# Patient Record
Sex: MALE | Race: WHITE | NOT HISPANIC OR LATINO | URBAN - METROPOLITAN AREA
[De-identification: names, ages, dates, MRNs, and addresses within clinical notes are randomized per-mention and may not be internally consistent; named-entity substitution may affect disease eponyms.]

---

## 2024-10-26 ENCOUNTER — EMERGENCY (EMERGENCY)
Facility: HOSPITAL | Age: 79
LOS: 1 days | Discharge: HOME | End: 2024-10-26
Attending: EMERGENCY MEDICINE | Admitting: EMERGENCY MEDICINE
Payer: MEDICARE

## 2024-10-26 VITALS
HEIGHT: 67 IN | SYSTOLIC BLOOD PRESSURE: 154 MMHG | DIASTOLIC BLOOD PRESSURE: 67 MMHG | TEMPERATURE: 98 F | OXYGEN SATURATION: 97 % | HEART RATE: 79 BPM | WEIGHT: 197.09 LBS | RESPIRATION RATE: 16 BRPM

## 2024-10-26 VITALS
RESPIRATION RATE: 16 BRPM | DIASTOLIC BLOOD PRESSURE: 72 MMHG | HEART RATE: 85 BPM | OXYGEN SATURATION: 100 % | SYSTOLIC BLOOD PRESSURE: 145 MMHG | TEMPERATURE: 98 F

## 2024-10-26 PROCEDURE — 99284 EMERGENCY DEPT VISIT MOD MDM: CPT | Mod: 25

## 2024-10-26 PROCEDURE — 70450 CT HEAD/BRAIN W/O DYE: CPT | Mod: 26,MC

## 2024-10-26 PROCEDURE — 12011 RPR F/E/E/N/L/M 2.5 CM/<: CPT | Mod: GC

## 2024-10-26 PROCEDURE — 72192 CT PELVIS W/O DYE: CPT | Mod: 26,MC

## 2024-10-26 RX ORDER — LIDOCAINE HCL 20 MG/ML
10 AMPUL (ML) INJECTION ONCE
Refills: 0 | Status: COMPLETED | OUTPATIENT
Start: 2024-10-26 | End: 2024-10-26

## 2024-10-26 RX ORDER — DOXYCYCLINE HYCLATE 100 MG
100 CAPSULE ORAL ONCE
Refills: 0 | Status: COMPLETED | OUTPATIENT
Start: 2024-10-26 | End: 2024-10-26

## 2024-10-26 RX ORDER — DOXYCYCLINE HYCLATE 100 MG
1 CAPSULE ORAL
Qty: 14 | Refills: 0
Start: 2024-10-26 | End: 2024-11-01

## 2024-10-26 RX ADMIN — Medication 10 MILLILITER(S): at 19:53

## 2024-10-26 RX ADMIN — Medication 100 MILLIGRAM(S): at 20:47

## 2024-10-26 NOTE — ED ADULT TRIAGE NOTE - CHIEF COMPLAINT QUOTE
Pt walked in c/o trip and fall today. Pt reports hitting his head. + lac to R ear. Denies loc or ac use. +R hip pain. Tdap utd. Pt was sent in from

## 2024-10-26 NOTE — ED PROVIDER NOTE - OBJECTIVE STATEMENT
78 yo M PMH balance issues, prostate  PCN allergy  sent from  for further evaluation after a fall  Patient reports that his walker caught on something and he fell  He injured his right ear which has a laceration to the top of the pinna as well as his right hip  No loc, neck pain, cp, back pain, abd pain, other ext injury or pain

## 2024-10-26 NOTE — ED PROVIDER NOTE - CLINICAL SUMMARY MEDICAL DECISION MAKING FREE TEXT BOX
78 yo M s/p mechanical fall with injuries to the right ear and right hip.  Will send for CT to assess for ICH/fx and will repair wound. Angel. amy. 78 yo M s/p mechanical fall with injuries to the right ear and right hip.  Will send for CT to assess for ICH/fx and will repair wound. Tet. utd.    Patient remained well throughout visit.  Ambulating well with his rollator.  Wound repaired per procedure note.  Discussed results, dx, treatment, follow up plan as well as strict return precautions in great detail with the patient and/or family member(s).  Questions answered and patient and or family member(s) verbalized understanding of the discussion and plan

## 2024-10-26 NOTE — ED ADULT NURSE NOTE - NSFALLUNIVINTERV_ED_ALL_ED
Bed/Stretcher in lowest position, wheels locked, appropriate side rails in place/Call bell, personal items and telephone in reach/Instruct patient to call for assistance before getting out of bed/chair/stretcher/Non-slip footwear applied when patient is off stretcher/[place] to call system/Physically safe environment - no spills, clutter or unnecessary equipment/Purposeful proactive rounding/Room/bathroom lighting operational, light cord in reach

## 2024-10-26 NOTE — ED ADULT NURSE NOTE - NS ED NURSE LEVEL OF CONSCIOUSNESS AFFECT
Anesthesia Evaluation     no history of anesthetic complications:  NPO Solid Status: > 8 hours  NPO Liquid Status: > 8 hours           Airway   Mallampati: II  TM distance: >3 FB  Neck ROM: limited  Possible difficult intubation  Dental    (+) lower dentures and upper dentures    Pulmonary    (+) COPD, decreased breath sounds,     ROS comment: On nasal canula O2 in hospital  Cardiovascular   Exercise tolerance: poor (<4 METS)    ECG reviewed  PT is on anticoagulation therapy  Patient on routine beta blocker and Beta blocker given within 24 hours of surgery  Rhythm: irregular  Rate: normal    (+) hypertension well controlled, dysrhythmias Atrial Flutter, hyperlipidemia,   (-) angina, murmur    ROS comment: Atrial flutter  ST &  T wave abnormality, consider inferior ischemia  ST &  T wave abnormality, consider anterolateral ischemia  Prolonged QT  Abnormal ECG  When compared with ECG of 08-MAR-2018 05:55,  Non-specific change in ST segment in Inferior leads  ST more depressed in Lateral leads  Inverted T waves have replaced nonspecific T wave abnormality in Inferior  leads  Inverted T waves have replaced nonspecific T wave abnormality in Lateral  leads  Confirmed by AKRAM    · Left ventricular systolic function is normal. Estimated EF = 55%.  · Left ventricular diastolic dysfunction (grade I) consistent with impaired relaxation.  · Mild mitral valve regurgitation is present  · Mild tricuspid valve regurgitation is present.       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       Calm

## 2024-10-26 NOTE — ED PROVIDER NOTE - NSFOLLOWUPINSTRUCTIONS_ED_ALL_ED_FT
YOUR CAT SCANS DID NOT SHOW ANY BROKEN BONES OR BLEEDING IN THE BRAIN    HOWEVER, CAT SCANS DO NOT SHOW IF A PERSON HAS A CONCUSSION.  A CONCUSSION IS A GROUP OF SYMPTOMS THAT CAN DEVELOPED AFTER A PERSON HAS A HEAD INJURY.  THESE SYMPTOMS INCLUDE: HEADACHE, NAUSEA, DIZZINESS, A FOGGY FEELING.  THEY CAN LAST ANYWHERE FROM A DAY TO A WEEK OR LONGER.      IT IS IMPORTANT TO HAVE A FOLLOW UP VISIT WITH YOUR DOCTOR THIS WEEK TO CHECK ON YOUR SYMPTOMS.  IF YOU DO DEVELOP A CONCUSSION, THEY WILL NEED TO MONITOR YOU FOR IMPROVEMENT AND TO DETERMINE IF YOU NEED TO SEE A NEUROLOGIST FOR SPECIALTY CARE.     FOR NOW, REST AS NEEDED AND KEEP WELL-HYDRATED.  TAKE TYLENOL FOR PAIN.  YOU DO NOT NEED TO STAY IN BED.  YOU CAN DO YOUR NORMAL ACTIVITIES.  BE MINDFUL OF THINGS THAT MAKE YOU HAVE ANY OF THE ABOVE SYMPTOMS.  MANY PEOPLE FIND THAT USING THEIR PHONE OR WATCHING TV CAN TRIGGER SYMPTOMS.  IF THIS HAPPENS, DO THESE ACTIVITIES FOR SHORTER PERIODS OF TIME.     BLEEDING IN THE BRAIN CAN SOMETIMES SHOW UP AT A LATER TIME, EVEN A MONTH OR LONGER AFTER THE ORIGINAL INJURY.  IT IS UNUSUAL, BUT IMPORTANT TO KNOW ABOUT. IF YOU NOTICE THAT YOU ARE HAVING HEADACHES THAT ARE NEW OR UNUSUAL FOR YOU OR THAT YOU ARE NAUSEAS, OFF-BALANCE, HAVE BLURRED VISION, TROUBLE WITH YOUR MEMORY OR ANY OTHER NEW OR CONCERNING SYMPTOMS, YOU NEED TO BE SEEN IN THE EMERGENCY ROOM TO HAVE ANOTHER CAT SCAN.     RETURN TO THE EMERGENCY ROOM IMMEDIATELY FOR:  VOMITING  SEVERE HEADACHE  VOMITING  NUMBNESS OR WEAKNESS TO THE FACE, ARMS OR LEGS  TROUBLE WALKING, TALKING OR SEEING  ANY NEW, WORSENING OR CONCERNING SYMPTOMS.         FOR THE CUT:    KEEP DRY AS MUCH AS POSSIBLE  DO NOT USE BANDAIDS TO COVER AS THEY CAN PULL THE STITCHES OUT  YOU WILL BE ON ANTIBIOTICS TO HELP PREVENT INFECTION  THE STITCHES CAN BE REMOVED IN ONE WEEK.  YOU CAN RETURN HERE OR GO TO URGENT CARE OR YOUR DOCTOR FOR THIS    RETURN TO THE EMERGENCY ROOM IF:  THE WOUND IS BLEEDING  THE STITCHES FALL OUT OR THE WOUND OPENS  THE EAR/WOUND GETS RED OR SWOLLEN  PUS IS DRAINING FROM THE WOUND  YOU HAVE A FEVER  ANY NEW, WORSENING OR CONCERNING SYMPTOMS

## 2024-10-26 NOTE — ED PROVIDER NOTE - PATIENT PORTAL LINK FT
You can access the FollowMyHealth Patient Portal offered by [facility] by registering at the following website: [email or web address]. By joining [facility]’s FollowMyHealth portal, you will also be able to view your health information using other applications (apps) compatible with our system.

## 2024-10-26 NOTE — ED PROVIDER NOTE - PHYSICAL EXAMINATION
General:  Well appearing, no distress  HEENT:  No conjunctival injection, neck supple, no congestion, no scalp hematoma, no cervical spine tenderness.  There is a stellate 1.5 cm laceration to the superior aspect of the right pinna  Chest:  Non-tender, no crepitance  Lungs:  Clear to auscultation bilaterally   Heart:  s1s2 normal, no murmur  Abdomen:  soft, non-tender, non-distended  :  Deferred  Rectal:  Deferred  Extremities: No edema, normal perfusion, no joint swelling or tenderness  Neuro:  Alert, conversant, motor/sensory grossly intact
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
## 2024-10-29 DIAGNOSIS — S79.911A UNSPECIFIED INJURY OF RIGHT HIP, INITIAL ENCOUNTER: ICD-10-CM

## 2024-10-29 DIAGNOSIS — S09.90XA UNSPECIFIED INJURY OF HEAD, INITIAL ENCOUNTER: ICD-10-CM

## 2024-10-29 DIAGNOSIS — Z88.0 ALLERGY STATUS TO PENICILLIN: ICD-10-CM

## 2024-11-02 ENCOUNTER — EMERGENCY (EMERGENCY)
Facility: HOSPITAL | Age: 79
LOS: 1 days | Discharge: HOME | End: 2024-11-02
Admitting: EMERGENCY MEDICINE
Payer: MEDICARE

## 2024-11-02 VITALS
HEIGHT: 67 IN | SYSTOLIC BLOOD PRESSURE: 123 MMHG | OXYGEN SATURATION: 99 % | DIASTOLIC BLOOD PRESSURE: 73 MMHG | HEART RATE: 77 BPM | TEMPERATURE: 98 F | RESPIRATION RATE: 16 BRPM

## 2024-11-02 PROCEDURE — L9995: CPT

## 2024-11-02 NOTE — ED PROVIDER NOTE - PHYSICAL EXAMINATION
Constitutional: Well appearing, awake, alert, oriented to person, place, time/situation and in no apparent distress.  HEENT: Airway patent, NCAT  Resp: no conversational dyspnea, tachypnea, or signs of respiratory distress  Msk: ambulating with normal steady gait  Neuro: A&Ox3  Skin: sutures in place to right ear but with significant persisting scab which partially obscures most sutures

## 2024-11-02 NOTE — ED PROVIDER NOTE - OBJECTIVE STATEMENT
79-year-old male presents today for suture removal to his right ear.  He denies fever chills swelling or discharge.

## 2024-11-02 NOTE — ED PROVIDER NOTE - CLINICAL SUMMARY MEDICAL DECISION MAKING FREE TEXT BOX
Patient presents today for suture removal.  Because of the scab partially obscuring the sutures I was unable to remove them at this time.  Patient was given instructions to gently remove some of the scab with a warm washcloth over the next couple of days and then return to the ER for suture removal.

## 2024-11-02 NOTE — ED PROVIDER NOTE - PATIENT PORTAL LINK FT
You can access the FollowMyHealth Patient Portal offered by [facility] by registering at the following website: [email or web address]. By joining [facility]’s FollowMyHealth portal, you will also be able to view your health information using other applications (apps) compatible with our system.

## 2024-11-02 NOTE — ED PROVIDER NOTE - NSFOLLOWUPINSTRUCTIONS_ED_ALL_ED_FT
Use a warm wet washcloth to gently exfoliate some of the scab away from the area 2-3 times a day for the next 2 to 3 days.  Once some of the scab is removed please return to the ER for suture removal in 2 to 3 days.    Laceration    A laceration is a cut that goes through all of the layers of the skin and into the tissue that is right under the skin. Some lacerations heal on their own. Others need to be closed with skin adhesive strips, skin glue, stitches (sutures), or staples. Proper laceration care minimizes the risk of infection and helps the laceration to heal better.  If non-absorbable stitches or staples have been placed, they must be taken out within the time frame instructed by your healthcare provider.    SEEK IMMEDIATE MEDICAL CARE IF YOU HAVE ANY OF THE FOLLOWING SYMPTOMS: swelling around the wound, worsening pain, drainage from the wound, red streaking going away from your wound, inability to move finger or toe near the laceration, or discoloration of skin near the laceration.

## 2024-11-05 ENCOUNTER — EMERGENCY (EMERGENCY)
Facility: HOSPITAL | Age: 79
LOS: 1 days | Discharge: HOME | End: 2024-11-05
Attending: EMERGENCY MEDICINE | Admitting: EMERGENCY MEDICINE
Payer: MEDICARE

## 2024-11-05 VITALS
OXYGEN SATURATION: 98 % | DIASTOLIC BLOOD PRESSURE: 91 MMHG | RESPIRATION RATE: 18 BRPM | TEMPERATURE: 98 F | SYSTOLIC BLOOD PRESSURE: 146 MMHG | HEART RATE: 76 BPM | HEIGHT: 67 IN

## 2024-11-05 DIAGNOSIS — Z88.0 ALLERGY STATUS TO PENICILLIN: ICD-10-CM

## 2024-11-05 PROBLEM — Z78.9 OTHER SPECIFIED HEALTH STATUS: Chronic | Status: ACTIVE | Noted: 2024-11-02

## 2024-11-05 PROCEDURE — 99283 EMERGENCY DEPT VISIT LOW MDM: CPT

## 2024-11-05 NOTE — ED ADULT NURSE NOTE - CHIEF COMPLAINT
Vitals: /97, otherwise normal  Gen: AAOx3, NAD, sitting comfortably in stretcher  Head: ncat, perrla, eomi b/l  Neck: supple, no lymphadenopathy, no midline deviation  Heart: rrr, no m/r/g  Lungs: CTA b/l, no rales/ronchi/wheezes  Abd: soft, nontender, non-distended, no rebound or guarding  Ext: no clubbing/cyanosis/edema  Neuro: sensation and muscle strength intact b/l, ambulatory with assistance
The patient is a 79y Male complaining of suture/staple removal.

## 2024-11-05 NOTE — ED PROVIDER NOTE - PHYSICAL EXAMINATION
CONSTITUTIONAL: Well appearing, well nourished, awake, alert, oriented to person, place, time/situation and in no apparent distress.  ENT: Airway patent, Nasal mucosa clear. Mouth with normal mucosa. + R ear laceration well healed with absorbable sutures already absorbed, wound explored and scab removed w/no FB  EYES: Clear bilaterally.  RESPIRATORY: Breathing comfortably with normal RR.  MSK: Range of motion is not limited, no deformities noted.  NEURO: Alert and oriented, no focal deficits.  SKIN: Skin normal color for race, warm, dry and intact. No evidence of rash.  PSYCH: Alert and oriented to person, place, time/situation. normal mood and affect. no apparent risk to self or others.

## 2024-11-05 NOTE — ED ADULT TRIAGE NOTE - CHIEF COMPLAINT QUOTE
Medication: Adderall 24hr  Sig: take 1 capsule by mouth daily   Qty: 30  Dosage: 10mg  Last Refill: 03/22/22  Pharmacy: [facility] [address]  Patient last seen:  Next appointment to be seen:    The patient is completely out of medication and mom is requesting a refill be sent today.  She was not aware that she has to call every month for the refill.    Please call her with any questions, she will follow up with the pharmacy.       pt here for suture removal to R ear.

## 2024-11-05 NOTE — ED PROVIDER NOTE - PATIENT PORTAL LINK FT
You can access the FollowMyHealth Patient Portal offered by [facility] by registering at the following website: [email or web address]. By joining [facility]’s FollowMyHealth portal, you will also be able to view your health information using other applications (apps) compatible with our system.

## 2024-11-05 NOTE — ED PROVIDER NOTE - OBJECTIVE STATEMENT
suture removal of R ear sutures placed last week for laceration. feeling well, no pain, + scabbed, no bleeding/discharge/swelling. No fever/chills.

## 2024-11-05 NOTE — ED ADULT NURSE NOTE - OBJECTIVE STATEMENT
79 year old male presenting for suture removal from right ear. No sutures to remove per MD, wound well healed.

## 2024-11-06 DIAGNOSIS — S01.311D LACERATION WITHOUT FOREIGN BODY OF RIGHT EAR, SUBSEQUENT ENCOUNTER: ICD-10-CM
